# Patient Record
Sex: MALE | Race: WHITE | NOT HISPANIC OR LATINO | Employment: OTHER | RURAL
[De-identification: names, ages, dates, MRNs, and addresses within clinical notes are randomized per-mention and may not be internally consistent; named-entity substitution may affect disease eponyms.]

---

## 2020-02-25 ENCOUNTER — HISTORICAL (OUTPATIENT)
Dept: ADMINISTRATIVE | Facility: HOSPITAL | Age: 70
End: 2020-02-25

## 2020-02-26 LAB
LAB AP CLINICAL INFORMATION: NORMAL
LAB AP DIAGNOSIS - HISTORICAL: NORMAL
LAB AP GROSS PATHOLOGY - HISTORICAL: NORMAL
LAB AP SPECIMEN SUBMITTED - HISTORICAL: NORMAL

## 2020-12-14 ENCOUNTER — HISTORICAL (OUTPATIENT)
Dept: ADMINISTRATIVE | Facility: HOSPITAL | Age: 70
End: 2020-12-14

## 2020-12-14 LAB
ALBUMIN SERPL BCP-MCNC: 3.9 G/DL (ref 3.5–5)
ALBUMIN/GLOB SERPL: 1.2 {RATIO}
ALP SERPL-CCNC: 79 U/L (ref 45–115)
ALT SERPL W P-5'-P-CCNC: 32 U/L (ref 16–61)
ANION GAP SERPL CALCULATED.3IONS-SCNC: 9 MMOL/L (ref 7–16)
AST SERPL W P-5'-P-CCNC: 20 U/L (ref 15–37)
BASOPHILS # BLD AUTO: 0.06 X10E3/UL (ref 0–0.2)
BASOPHILS NFR BLD AUTO: 1.4 % (ref 0–1)
BILIRUB SERPL-MCNC: 0.5 MG/DL (ref 0–1.2)
BUN SERPL-MCNC: 21 MG/DL (ref 7–18)
BUN/CREAT SERPL: 19
CALCIUM SERPL-MCNC: 9 MG/DL (ref 8.5–10.1)
CHLORIDE SERPL-SCNC: 108 MMOL/L (ref 98–107)
CO2 SERPL-SCNC: 29 MMOL/L (ref 21–32)
CREAT SERPL-MCNC: 1.1 MG/DL (ref 0.7–1.3)
EOSINOPHIL # BLD AUTO: 0.34 X10E3/UL (ref 0–0.5)
EOSINOPHIL NFR BLD AUTO: 7.9 % (ref 1–4)
ERYTHROCYTE [DISTWIDTH] IN BLOOD BY AUTOMATED COUNT: 13.4 % (ref 11.5–14.5)
EST. AVERAGE GLUCOSE BLD GHB EST-MCNC: 127 MG/DL
GLOBULIN SER-MCNC: 3.2 G/DL (ref 2–4)
GLUCOSE SERPL-MCNC: 148 MG/DL (ref 74–106)
HBA1C MFR BLD HPLC: 6.4 %
HCT VFR BLD AUTO: 42 % (ref 40–54)
HGB BLD-MCNC: 13.4 G/DL (ref 13.5–18)
IMM GRANULOCYTES # BLD AUTO: 0.02 X10E3/UL (ref 0–0.04)
IMM GRANULOCYTES NFR BLD: 0.5 % (ref 0–0.4)
LYMPHOCYTES # BLD AUTO: 0.6 X10E3/UL (ref 1–4.8)
LYMPHOCYTES NFR BLD AUTO: 14 % (ref 27–41)
MCH RBC QN AUTO: 31.2 PG (ref 27–31)
MCHC RBC AUTO-ENTMCNC: 31.9 G/DL (ref 32–36)
MCV RBC AUTO: 97.7 FL (ref 80–96)
MONOCYTES # BLD AUTO: 0.38 X10E3/UL (ref 0–0.8)
MONOCYTES NFR BLD AUTO: 8.9 % (ref 2–6)
MPC BLD CALC-MCNC: 11.1 FL (ref 9.4–12.4)
NEUTROPHILS # BLD AUTO: 2.89 X10E3/UL (ref 1.8–7.7)
NEUTROPHILS NFR BLD AUTO: 67.3 % (ref 53–65)
NRBC # BLD AUTO: 0 X10E3/UL (ref 0–0)
NRBC, AUTO (.00): 0 /100 (ref 0–0)
PLATELET # BLD AUTO: 196 X10E3/UL (ref 150–400)
POTASSIUM SERPL-SCNC: 4.5 MMOL/L (ref 3.5–5.1)
PROT SERPL-MCNC: 7.1 G/DL (ref 6.4–8.2)
PSA SERPL-MCNC: 0.07 NG/ML (ref 0–4.4)
RBC # BLD AUTO: 4.3 X10E6/UL (ref 4.6–6.2)
SODIUM SERPL-SCNC: 141 MMOL/L (ref 136–145)
TESTOST SERPL-MCNC: 12 NG/DL (ref 240–950)
WBC # BLD AUTO: 4.29 X10E3/UL (ref 4.5–11)

## 2021-01-11 ENCOUNTER — HISTORICAL (OUTPATIENT)
Dept: ADMINISTRATIVE | Facility: HOSPITAL | Age: 71
End: 2021-01-11

## 2021-01-11 LAB
ALBUMIN SERPL BCP-MCNC: 3.9 G/DL (ref 3.5–5)
ALBUMIN/GLOB SERPL: 1.3 {RATIO}
ALP SERPL-CCNC: 87 U/L (ref 45–115)
ALT SERPL W P-5'-P-CCNC: 26 U/L (ref 16–61)
ANION GAP SERPL CALCULATED.3IONS-SCNC: 9.1 MMOL/L (ref 7–16)
AST SERPL W P-5'-P-CCNC: 16 U/L (ref 15–37)
BASOPHILS # BLD AUTO: 0.06 X10E3/UL (ref 0–0.2)
BASOPHILS NFR BLD AUTO: 1.6 % (ref 0–1)
BILIRUB SERPL-MCNC: 0.4 MG/DL (ref 0–1.2)
BUN SERPL-MCNC: 19 MG/DL (ref 7–18)
BUN/CREAT SERPL: 19
CALCIUM SERPL-MCNC: 8.8 MG/DL (ref 8.5–10.1)
CHLORIDE SERPL-SCNC: 109 MMOL/L (ref 98–107)
CO2 SERPL-SCNC: 29 MMOL/L (ref 21–32)
CREAT SERPL-MCNC: 1.01 MG/DL (ref 0.7–1.3)
EOSINOPHIL # BLD AUTO: 0.37 X10E3/UL (ref 0–0.5)
EOSINOPHIL NFR BLD AUTO: 10.2 % (ref 1–4)
ERYTHROCYTE [DISTWIDTH] IN BLOOD BY AUTOMATED COUNT: 13.4 % (ref 11.5–14.5)
GLOBULIN SER-MCNC: 2.9 G/DL (ref 2–4)
GLUCOSE SERPL-MCNC: 157 MG/DL (ref 74–106)
HCT VFR BLD AUTO: 41.7 % (ref 40–54)
HGB BLD-MCNC: 13.4 G/DL (ref 13.5–18)
IMM GRANULOCYTES # BLD AUTO: 0 X10E3/UL (ref 0–0.04)
IMM GRANULOCYTES NFR BLD: 0 % (ref 0–0.4)
LYMPHOCYTES # BLD AUTO: 0.58 X10E3/UL (ref 1–4.8)
LYMPHOCYTES NFR BLD AUTO: 15.9 % (ref 27–41)
MCH RBC QN AUTO: 31 PG (ref 27–31)
MCHC RBC AUTO-ENTMCNC: 32.1 G/DL (ref 32–36)
MCV RBC AUTO: 96.5 FL (ref 80–96)
MONOCYTES # BLD AUTO: 0.39 X10E3/UL (ref 0–0.8)
MONOCYTES NFR BLD AUTO: 10.7 % (ref 2–6)
MPC BLD CALC-MCNC: 11.8 FL (ref 9.4–12.4)
NEUTROPHILS # BLD AUTO: 2.24 X10E3/UL (ref 1.8–7.7)
NEUTROPHILS NFR BLD AUTO: 61.6 % (ref 53–65)
NRBC # BLD AUTO: 0 X10E3/UL (ref 0–0)
NRBC, AUTO (.00): 0 /100 (ref 0–0)
PLATELET # BLD AUTO: 174 X10E3/UL (ref 150–400)
POTASSIUM SERPL-SCNC: 5.1 MMOL/L (ref 3.5–5.1)
PROT SERPL-MCNC: 6.8 G/DL (ref 6.4–8.2)
PSA SERPL-MCNC: 0.04 NG/ML (ref 0–4.4)
PTH-INTACT SERPL-MCNC: 22.4 PG/ML (ref 18.4–80.1)
RBC # BLD AUTO: 4.32 X10E6/UL (ref 4.6–6.2)
SODIUM SERPL-SCNC: 142 MMOL/L (ref 136–145)
TESTOST SERPL-MCNC: 12 NG/DL (ref 240–950)
WBC # BLD AUTO: 3.64 X10E3/UL (ref 4.5–11)

## 2021-01-13 ENCOUNTER — HISTORICAL (OUTPATIENT)
Dept: ADMINISTRATIVE | Facility: HOSPITAL | Age: 71
End: 2021-01-13

## 2021-01-13 LAB
POC IONIZED CALCIUM: 1.25 MMOL/L (ref 1.15–1.35)
REPORT: NO GROWTH
REPORT: NORMAL

## 2021-02-09 ENCOUNTER — HISTORICAL (OUTPATIENT)
Dept: ADMINISTRATIVE | Facility: HOSPITAL | Age: 71
End: 2021-02-09

## 2021-02-09 LAB
ALBUMIN SERPL BCP-MCNC: 3.9 G/DL (ref 3.5–5)
ALBUMIN/GLOB SERPL: 1.1 {RATIO}
ALP SERPL-CCNC: 75 U/L (ref 45–115)
ALT SERPL W P-5'-P-CCNC: 24 U/L (ref 16–61)
ANION GAP SERPL CALCULATED.3IONS-SCNC: 9.7 MMOL/L (ref 7–16)
AST SERPL W P-5'-P-CCNC: 19 U/L (ref 15–37)
BASOPHILS # BLD AUTO: 0.05 X10E3/UL (ref 0–0.2)
BASOPHILS NFR BLD AUTO: 1.5 % (ref 0–1)
BILIRUB SERPL-MCNC: 0.5 MG/DL (ref 0–1.2)
BUN SERPL-MCNC: 19 MG/DL (ref 7–18)
BUN/CREAT SERPL: 18
CALCIUM SERPL-MCNC: 8.5 MG/DL (ref 8.5–10.1)
CHLORIDE SERPL-SCNC: 109 MMOL/L (ref 98–107)
CO2 SERPL-SCNC: 28 MMOL/L (ref 21–32)
CREAT SERPL-MCNC: 1.03 MG/DL (ref 0.7–1.3)
EOSINOPHIL # BLD AUTO: 0.38 X10E3/UL (ref 0–0.5)
EOSINOPHIL NFR BLD AUTO: 11.7 % (ref 1–4)
ERYTHROCYTE [DISTWIDTH] IN BLOOD BY AUTOMATED COUNT: 14.2 % (ref 11.5–14.5)
EST. AVERAGE GLUCOSE BLD GHB EST-MCNC: 134 MG/DL
GLOBULIN SER-MCNC: 3.4 G/DL (ref 2–4)
GLUCOSE SERPL-MCNC: 175 MG/DL (ref 74–106)
HBA1C MFR BLD HPLC: 6.6 %
HCT VFR BLD AUTO: 41.5 % (ref 40–54)
HGB BLD-MCNC: 13.3 G/DL (ref 13.5–18)
IMM GRANULOCYTES # BLD AUTO: 0.01 X10E3/UL (ref 0–0.04)
IMM GRANULOCYTES NFR BLD: 0.3 % (ref 0–0.4)
LYMPHOCYTES # BLD AUTO: 0.5 X10E3/UL (ref 1–4.8)
LYMPHOCYTES NFR BLD AUTO: 15.4 % (ref 27–41)
MCH RBC QN AUTO: 31 PG (ref 27–31)
MCHC RBC AUTO-ENTMCNC: 32 G/DL (ref 32–36)
MCV RBC AUTO: 96.7 FL (ref 80–96)
MONOCYTES # BLD AUTO: 0.48 X10E3/UL (ref 0–0.8)
MONOCYTES NFR BLD AUTO: 14.8 % (ref 2–6)
MPC BLD CALC-MCNC: 11.4 FL (ref 9.4–12.4)
NEUTROPHILS # BLD AUTO: 1.83 X10E3/UL (ref 1.8–7.7)
NEUTROPHILS NFR BLD AUTO: 56.3 % (ref 53–65)
NRBC # BLD AUTO: 0 X10E3/UL (ref 0–0)
NRBC, AUTO (.00): 0 /100 (ref 0–0)
PLATELET # BLD AUTO: 177 X10E3/UL (ref 150–400)
POTASSIUM SERPL-SCNC: 4.7 MMOL/L (ref 3.5–5.1)
PROT SERPL-MCNC: 7.3 G/DL (ref 6.4–8.2)
PSA SERPL-MCNC: 0.02 NG/ML (ref 0–4.4)
RBC # BLD AUTO: 4.29 X10E6/UL (ref 4.6–6.2)
SODIUM SERPL-SCNC: 142 MMOL/L (ref 136–145)
TSH SERPL DL<=0.005 MIU/L-ACNC: 2.52 UIU/ML (ref 0.36–3.74)
WBC # BLD AUTO: 3.25 X10E3/UL (ref 4.5–11)

## 2021-02-10 LAB — TESTOST SERPL-MCNC: <7 NG/DL (ref 240–950)

## 2021-09-15 ENCOUNTER — LAB VISIT (OUTPATIENT)
Dept: LAB | Facility: HOSPITAL | Age: 71
End: 2021-09-15
Attending: FAMILY MEDICINE
Payer: COMMERCIAL

## 2021-09-15 DIAGNOSIS — E87.5 SERUM POTASSIUM ELEVATED: Primary | ICD-10-CM

## 2021-09-15 LAB
ANION GAP SERPL CALCULATED.3IONS-SCNC: 11 MMOL/L (ref 7–16)
BUN SERPL-MCNC: 15 MG/DL (ref 7–18)
BUN/CREAT SERPL: 15 (ref 6–20)
CALCIUM SERPL-MCNC: 8.9 MG/DL (ref 8.5–10.1)
CHLORIDE SERPL-SCNC: 104 MMOL/L (ref 98–107)
CO2 SERPL-SCNC: 29 MMOL/L (ref 21–32)
CREAT SERPL-MCNC: 0.98 MG/DL (ref 0.7–1.3)
GLUCOSE SERPL-MCNC: 192 MG/DL (ref 74–106)
POTASSIUM SERPL-SCNC: 5.3 MMOL/L (ref 3.5–5.1)
SODIUM SERPL-SCNC: 139 MMOL/L (ref 136–145)
TESTOST SERPL-MCNC: 7 NG/DL (ref 240–950)

## 2021-09-15 PROCEDURE — 84403 ASSAY OF TOTAL TESTOSTERONE: CPT

## 2021-09-15 PROCEDURE — 80048 BASIC METABOLIC PNL TOTAL CA: CPT

## 2021-09-15 PROCEDURE — 36415 COLL VENOUS BLD VENIPUNCTURE: CPT

## 2021-12-16 ENCOUNTER — HOSPITAL ENCOUNTER (OUTPATIENT)
Dept: RADIOLOGY | Facility: HOSPITAL | Age: 71
Discharge: HOME | End: 2021-12-16
Attending: FAMILY MEDICINE
Payer: COMMERCIAL

## 2021-12-16 DIAGNOSIS — R05.9 COUGH: ICD-10-CM

## 2021-12-16 PROCEDURE — 71046 X-RAY EXAM CHEST 2 VIEWS: CPT | Mod: TC

## 2023-12-18 ENCOUNTER — HOSPITAL ENCOUNTER (OUTPATIENT)
Dept: RADIOLOGY | Facility: HOSPITAL | Age: 73
Discharge: HOME | End: 2023-12-18
Attending: INTERNAL MEDICINE
Payer: MEDICARE

## 2023-12-18 DIAGNOSIS — C61 PROSTATE CA: ICD-10-CM

## 2023-12-18 DIAGNOSIS — C79.51 METASTASIS TO BONE: ICD-10-CM

## 2023-12-18 PROCEDURE — 77080 DXA BONE DENSITY AXIAL: CPT | Mod: 26,,, | Performed by: RADIOLOGY

## 2023-12-18 PROCEDURE — 77080 DXA BONE DENSITY AXIAL: CPT | Mod: TC

## 2023-12-18 PROCEDURE — 77080 DXA BONE DENSITY AXIAL SKELETON 1 OR MORE SITES: ICD-10-PCS | Mod: 26,,, | Performed by: RADIOLOGY

## 2024-11-11 DIAGNOSIS — Z86.0100 HX OF COLONIC POLYPS: Primary | ICD-10-CM

## 2024-12-10 ENCOUNTER — LAB VISIT (OUTPATIENT)
Dept: LAB | Facility: HOSPITAL | Age: 74
End: 2024-12-10
Payer: MEDICARE

## 2024-12-10 DIAGNOSIS — R19.7 DIARRHEA OF PRESUMED INFECTIOUS ORIGIN: ICD-10-CM

## 2024-12-10 DIAGNOSIS — N18.6 TYPE 2 DIABETES MELLITUS WITH END-STAGE RENAL DISEASE: Primary | ICD-10-CM

## 2024-12-10 DIAGNOSIS — E11.22 TYPE 2 DIABETES MELLITUS WITH END-STAGE RENAL DISEASE: Primary | ICD-10-CM

## 2024-12-10 DIAGNOSIS — R53.83 FATIGUE, UNSPECIFIED TYPE: ICD-10-CM

## 2024-12-10 LAB
ALBUMIN SERPL BCP-MCNC: 4 G/DL (ref 3.4–4.8)
ALBUMIN/GLOB SERPL: 1.1 {RATIO}
ALP SERPL-CCNC: 92 U/L (ref 40–150)
ALT SERPL W P-5'-P-CCNC: 19 U/L
ANION GAP SERPL CALCULATED.3IONS-SCNC: 15 MMOL/L (ref 7–16)
AST SERPL W P-5'-P-CCNC: 21 U/L (ref 5–34)
BASOPHILS # BLD AUTO: 0.07 K/UL (ref 0–0.2)
BASOPHILS NFR BLD AUTO: 1 % (ref 0–1)
BILIRUB SERPL-MCNC: 0.4 MG/DL
BUN SERPL-MCNC: 16 MG/DL (ref 8–26)
BUN/CREAT SERPL: 12 (ref 6–20)
CALCIUM SERPL-MCNC: 9.9 MG/DL (ref 8.8–10)
CHLORIDE SERPL-SCNC: 107 MMOL/L (ref 98–107)
CO2 SERPL-SCNC: 21 MMOL/L (ref 23–31)
CREAT SERPL-MCNC: 1.31 MG/DL (ref 0.72–1.25)
DIFFERENTIAL METHOD BLD: ABNORMAL
EGFR (NO RACE VARIABLE) (RUSH/TITUS): 57 ML/MIN/1.73M2
EOSINOPHIL # BLD AUTO: 0.2 K/UL (ref 0–0.5)
EOSINOPHIL NFR BLD AUTO: 3 % (ref 1–4)
ERYTHROCYTE [DISTWIDTH] IN BLOOD BY AUTOMATED COUNT: 13.2 % (ref 11.5–14.5)
GLOBULIN SER-MCNC: 3.6 G/DL (ref 2–4)
GLUCOSE SERPL-MCNC: 177 MG/DL (ref 82–115)
HCT VFR BLD AUTO: 43.1 % (ref 40–54)
HGB BLD-MCNC: 14.4 G/DL (ref 13.5–18)
IMM GRANULOCYTES # BLD AUTO: 0.02 K/UL (ref 0–0.04)
IMM GRANULOCYTES NFR BLD: 0.3 % (ref 0–0.4)
LYMPHOCYTES # BLD AUTO: 1.07 K/UL (ref 1–4.8)
LYMPHOCYTES NFR BLD AUTO: 15.9 % (ref 27–41)
MCH RBC QN AUTO: 30.6 PG (ref 27–31)
MCHC RBC AUTO-ENTMCNC: 33.4 G/DL (ref 32–36)
MCV RBC AUTO: 91.7 FL (ref 80–96)
MONOCYTES # BLD AUTO: 0.48 K/UL (ref 0–0.8)
MONOCYTES NFR BLD AUTO: 7.1 % (ref 2–6)
MPC BLD CALC-MCNC: 10.4 FL (ref 9.4–12.4)
NEUTROPHILS # BLD AUTO: 4.9 K/UL (ref 1.8–7.7)
NEUTROPHILS NFR BLD AUTO: 72.7 % (ref 53–65)
NRBC # BLD AUTO: 0 X10E3/UL
NRBC, AUTO (.00): 0 %
PLATELET # BLD AUTO: 250 K/UL (ref 150–400)
POTASSIUM SERPL-SCNC: 4.9 MMOL/L (ref 3.5–5.1)
PROT SERPL-MCNC: 7.6 G/DL (ref 5.8–7.6)
RBC # BLD AUTO: 4.7 M/UL (ref 4.6–6.2)
SODIUM SERPL-SCNC: 138 MMOL/L (ref 136–145)
TSH SERPL DL<=0.005 MIU/L-ACNC: 2.31 UIU/ML (ref 0.35–4.94)
WBC # BLD AUTO: 6.74 K/UL (ref 4.5–11)

## 2024-12-10 PROCEDURE — 36415 COLL VENOUS BLD VENIPUNCTURE: CPT

## 2024-12-10 PROCEDURE — 80053 COMPREHEN METABOLIC PANEL: CPT

## 2024-12-10 PROCEDURE — 85025 COMPLETE CBC W/AUTO DIFF WBC: CPT

## 2024-12-10 PROCEDURE — 84443 ASSAY THYROID STIM HORMONE: CPT

## 2025-02-28 ENCOUNTER — HOSPITAL ENCOUNTER (OUTPATIENT)
Dept: GASTROENTEROLOGY | Facility: HOSPITAL | Age: 75
Discharge: HOME | End: 2025-02-28
Attending: INTERNAL MEDICINE | Admitting: INTERNAL MEDICINE
Payer: MEDICARE

## 2025-02-28 ENCOUNTER — ANESTHESIA (OUTPATIENT)
Dept: GASTROENTEROLOGY | Facility: HOSPITAL | Age: 75
End: 2025-02-28
Payer: MEDICARE

## 2025-02-28 ENCOUNTER — ANESTHESIA EVENT (OUTPATIENT)
Dept: GASTROENTEROLOGY | Facility: HOSPITAL | Age: 75
End: 2025-02-28
Payer: MEDICARE

## 2025-02-28 VITALS
RESPIRATION RATE: 13 BRPM | WEIGHT: 220 LBS | DIASTOLIC BLOOD PRESSURE: 70 MMHG | HEART RATE: 62 BPM | BODY MASS INDEX: 29.16 KG/M2 | HEIGHT: 73 IN | SYSTOLIC BLOOD PRESSURE: 121 MMHG | OXYGEN SATURATION: 99 % | TEMPERATURE: 98 F

## 2025-02-28 DIAGNOSIS — K63.5 POLYP OF DESCENDING COLON, UNSPECIFIED TYPE: ICD-10-CM

## 2025-02-28 DIAGNOSIS — K64.8 INTERNAL HEMORRHOID: ICD-10-CM

## 2025-02-28 DIAGNOSIS — K57.30 DIVERTICULOSIS OF COLON: ICD-10-CM

## 2025-02-28 DIAGNOSIS — Z12.11 SCREENING FOR MALIGNANT NEOPLASM OF COLON: Primary | ICD-10-CM

## 2025-02-28 DIAGNOSIS — Z86.0100 HX OF COLONIC POLYPS: ICD-10-CM

## 2025-02-28 PROCEDURE — 63600175 PHARM REV CODE 636 W HCPCS: Performed by: NURSE ANESTHETIST, CERTIFIED REGISTERED

## 2025-02-28 PROCEDURE — 88305 TISSUE EXAM BY PATHOLOGIST: CPT | Mod: 26,,, | Performed by: PATHOLOGY

## 2025-02-28 PROCEDURE — 27201423 OPTIME MED/SURG SUP & DEVICES STERILE SUPPLY

## 2025-02-28 PROCEDURE — 27000716 HC OXISENSOR PROBE, ANY SIZE: Performed by: NURSE ANESTHETIST, CERTIFIED REGISTERED

## 2025-02-28 PROCEDURE — 45380 COLONOSCOPY AND BIOPSY: CPT | Mod: PT | Performed by: INTERNAL MEDICINE

## 2025-02-28 PROCEDURE — 88305 TISSUE EXAM BY PATHOLOGIST: CPT | Mod: TC,SUR | Performed by: INTERNAL MEDICINE

## 2025-02-28 PROCEDURE — 37000008 HC ANESTHESIA 1ST 15 MINUTES

## 2025-02-28 PROCEDURE — 45380 COLONOSCOPY AND BIOPSY: CPT | Mod: PT,,, | Performed by: INTERNAL MEDICINE

## 2025-02-28 PROCEDURE — 27000284 HC CANNULA NASAL: Performed by: NURSE ANESTHETIST, CERTIFIED REGISTERED

## 2025-02-28 PROCEDURE — 37000009 HC ANESTHESIA EA ADD 15 MINS

## 2025-02-28 RX ORDER — SODIUM CHLORIDE, SODIUM LACTATE, POTASSIUM CHLORIDE, CALCIUM CHLORIDE 600; 310; 30; 20 MG/100ML; MG/100ML; MG/100ML; MG/100ML
INJECTION, SOLUTION INTRAVENOUS CONTINUOUS
Status: DISCONTINUED | OUTPATIENT
Start: 2025-02-28 | End: 2025-03-01 | Stop reason: HOSPADM

## 2025-02-28 RX ORDER — ROSUVASTATIN CALCIUM 5 MG/1
5 TABLET, COATED ORAL NIGHTLY
COMMUNITY
Start: 2025-02-05

## 2025-02-28 RX ORDER — APALUTAMIDE 60 MG/1
60 TABLET, FILM COATED ORAL DAILY
COMMUNITY

## 2025-02-28 RX ORDER — PIOGLITAZONEHYDROCHLORIDE 30 MG/1
1 TABLET ORAL DAILY
COMMUNITY

## 2025-02-28 RX ORDER — GLIMEPIRIDE 2 MG/1
2 TABLET ORAL 2 TIMES DAILY
COMMUNITY

## 2025-02-28 RX ORDER — SODIUM CHLORIDE 0.9 % (FLUSH) 0.9 %
10 SYRINGE (ML) INJECTION EVERY 6 HOURS PRN
Status: DISCONTINUED | OUTPATIENT
Start: 2025-02-28 | End: 2025-03-01 | Stop reason: HOSPADM

## 2025-02-28 RX ORDER — LIDOCAINE HYDROCHLORIDE 20 MG/ML
INJECTION, SOLUTION EPIDURAL; INFILTRATION; INTRACAUDAL; PERINEURAL
Status: DISCONTINUED | OUTPATIENT
Start: 2025-02-28 | End: 2025-02-28

## 2025-02-28 RX ORDER — INSULIN GLARGINE 300 U/ML
300 INJECTION, SOLUTION SUBCUTANEOUS DAILY
COMMUNITY
Start: 2025-02-05

## 2025-02-28 RX ORDER — PROPOFOL 10 MG/ML
VIAL (ML) INTRAVENOUS
Status: DISCONTINUED | OUTPATIENT
Start: 2025-02-28 | End: 2025-02-28

## 2025-02-28 RX ADMIN — LIDOCAINE HYDROCHLORIDE 100 MG: 20 INJECTION, SOLUTION EPIDURAL; INFILTRATION; INTRACAUDAL; PERINEURAL at 11:02

## 2025-02-28 RX ADMIN — PROPOFOL 100 MG: 10 INJECTION, EMULSION INTRAVENOUS at 11:02

## 2025-02-28 NOTE — H&P
[facility]  Gastroenterology  H&P    Patient Name: Reese Blackwell  MRN: [id number]  Admission Date: 2/28/2025  Code Status: No Order    Attending Provider: Dakotah Bush MD   Primary Care Physician: Jamin Delacruz MD  Principal Problem:<principal problem not specified>    Subjective:     History of Present Illness:  This patient is a 74-year-old man with history of colon polyps.  His last colonoscopy was in 2020.    Past Medical History:   Diagnosis Date    Cancer     Diabetes mellitus        Past Surgical History:   Procedure Laterality Date    PROSTATECTOMY      SHOULDER SURGERY         Review of patient's allergies indicates:  No Known Allergies  Family History    None       Tobacco Use    Smoking status: Never    Smokeless tobacco: Current   Substance and Sexual Activity    Alcohol use: Not Currently    Drug use: Never    Sexual activity: Not on file     Review of Systems   Constitutional: Negative.    HENT: Negative.     Respiratory: Negative.     Cardiovascular: Negative.    Gastrointestinal: Negative.      Objective:     Vital Signs (Most Recent):  Temp: 98 °F (36.7 °C) (02/28/25 1001)  Pulse: 67 (02/28/25 1001)  Resp: 17 (02/28/25 1001)  BP: 123/64 (02/28/25 1001)  SpO2: 96 % (02/28/25 1001) Vital Signs (24h Range):  Temp:  [98 °F (36.7 °C)] 98 °F (36.7 °C)  Pulse:  [67] 67  Resp:  [17] 17  SpO2:  [96 %] 96 %  BP: (123)/(64) 123/64     Weight: 99.8 kg (220 lb) (02/28/25 1001)  Body mass index is 29.03 kg/m².    No intake or output data in the 24 hours ending 02/28/25 1049    Lines/Drains/Airways       Peripheral Intravenous Line  Duration                  Peripheral IV - Single Lumen 02/28/25 1001 22 G Left Antecubital <1 day                    Physical Exam  Vitals reviewed.   Constitutional:       General: He is not in acute distress.     Appearance: Normal appearance. He is well-developed. He is not ill-appearing.   HENT:      Head: Normocephalic and atraumatic.      Nose: Nose normal.  "  Eyes:      Pupils: Pupils are equal, round, and reactive to light.   Cardiovascular:      Rate and Rhythm: Normal rate and regular rhythm.   Pulmonary:      Effort: Pulmonary effort is normal.      Breath sounds: Normal breath sounds. No wheezing.   Abdominal:      General: Abdomen is flat. Bowel sounds are normal. There is no distension.      Palpations: Abdomen is soft.      Tenderness: There is no abdominal tenderness. There is no guarding.   Skin:     General: Skin is warm and dry.      Coloration: Skin is not jaundiced.   Neurological:      Mental Status: He is alert.   Psychiatric:         Attention and Perception: Attention normal.         Mood and Affect: Affect normal.         Speech: Speech normal.         Behavior: Behavior is cooperative.      Comments: Pt was calm while speaking.         Significant Labs:  CBC: No results for input(s): "WBC", "HGB", "HCT", "PLT" in the last 48 hours.  CMP: No results for input(s): "GLU", "CALCIUM", "ALBUMIN", "PROT", "NA", "K", "CO2", "CL", "BUN", "CREATININE", "ALKPHOS", "ALT", "AST", "BILITOT" in the last 48 hours.    Significant Imaging:  Imaging results within the past 24 hours have been reviewed.    Assessment/Plan:     There are no hospital problems to display for this patient.        Impression: History of colon polyp  Plan: Colonoscopy today    Dakotah Bush MD  Gastroenterology  [facility]  "

## 2025-02-28 NOTE — ANESTHESIA POSTPROCEDURE EVALUATION
Anesthesia Post Evaluation    Patient: Reese Blackwell    Procedure(s) Performed: Colonoscopy    Final Anesthesia Type: MAC      Patient location during evaluation: GI PACU  Patient participation: Yes- Able to Participate  Level of consciousness: awake and alert  Post-procedure vital signs: reviewed and stable  Pain management: adequate  Airway patency: patent    PONV status at discharge: No PONV  Anesthetic complications: no      Cardiovascular status: blood pressure returned to baseline and hemodynamically stable  Respiratory status: spontaneous ventilation  Hydration status: euvolemic  Follow-up not needed.  Comments: Refer to nursing notes for pain/whit score upon discharge from recovery.              Vitals Value Taken Time   /76 02/28/25 11:38   Temp 97.8 02/28/25 13:55   Pulse 59 02/28/25 11:40   Resp 15 02/28/25 11:40   SpO2 98 % 02/28/25 11:40   Vitals shown include unfiled device data.      Event Time   Out of Recovery 11:44:30         Pain/Whit Score: Whit Score: 10 (2/28/2025 11:24 AM)

## 2025-02-28 NOTE — DISCHARGE INSTRUCTIONS
Procedure Date  2/28/25     Impression  Overall Impression:   Polyp in the descending colon; performed cold forceps biopsy  Diverticulosis in the descending colon and sigmoid colon  (grade 1) hemorrhoids  Digital rectal exam revealed no mass.  The colon was examined from the rectum to the cecum and diverticula were present in the descending and sigmoid colon.  One diminutive polyp was removed from the descending colon with biopsy.  Grade 1 internal hemorrhoids were present.     Recommendation  Await pathology results, due: 3/4/2025   Repeat colonoscopy in 5 years      Disposition: Discharge patient to home in stable condition.  High-fiber diet.  No driving until tomorrow.  Follow up pathology results next week.  Follow up with PCP as scheduled, return to GI clinic p.r.n..  Consider repeat colonoscopy in 5 years if patient is in good health at that time.   NO DRIVING, OPERATING EQUIPMENT, OR SIGNING LEGAL DOCUMENTS FOR 24 HOURS.

## 2025-02-28 NOTE — TRANSFER OF CARE
"Anesthesia Transfer of Care Note    Patient: Reese Blackwell    Procedure(s) Performed: * No procedures listed *    Patient location: GI    Anesthesia Type: MAC    Transport from OR: Transported from OR on room air with adequate spontaneous ventilation. Continuous ECG monitoring in transport. Continuous SpO2 monitoring in transport    Post pain: adequate analgesia    Post assessment: no apparent anesthetic complications    Post vital signs: stable    Level of consciousness: sedated and responds to stimulation    Nausea/Vomiting: no nausea/vomiting    Complications: none    Transfer of care protocol was followedComments: Good SV continue, NAD, VSS, RTRN      Last vitals: Visit Vitals  BP (!) 106/47 (BP Location: Right arm, Patient Position: Lying)   Pulse 69   Temp 36.4 °C (97.6 °F) (Oral)   Resp 20   Ht 6' 1" (1.854 m)   Wt 99.8 kg (220 lb)   SpO2 98%   BMI 29.03 kg/m²     "

## 2025-02-28 NOTE — ANESTHESIA PREPROCEDURE EVALUATION
02/28/2025  Reese Blackwell is a 74 y.o., male.      Pre-op Assessment    I have reviewed the Patient Summary Reports.     I have reviewed the Nursing Notes. I have reviewed the NPO Status.   I have reviewed the Medications.     Review of Systems  Anesthesia Hx:  No problems with previous Anesthesia                Social:  Non-Smoker, No Alcohol Use       Hematology/Oncology:                        --  Cancer in past history:              surgery   Oncology Comments: Prostate     Cardiovascular:     Hypertension           hyperlipidemia                               Endocrine:  Diabetes, well controlled, type 2         Obesity / BMI > 30      Physical Exam  General: Cooperative, Alert, Well nourished and Oriented    Airway:  Mallampati: III   Mouth Opening: Normal  TM Distance: 4 - 6 cm  Tongue: Normal  Neck ROM: Normal ROM    Dental:  Intact        Anesthesia Plan  Type of Anesthesia, risks & benefits discussed:    Anesthesia Type: MAC  Intra-op Monitoring Plan: Standard ASA Monitors  Post Op Pain Control Plan: multimodal analgesia and IV/PO Opioids PRN  Induction:  IV  Informed Consent: Informed consent signed with the Patient and all parties understand the risks and agree with anesthesia plan.  All questions answered. Patient consented to blood products? Yes  ASA Score: 3  Day of Surgery Review of History & Physical: I have interviewed and examined the patient. I have reviewed the patient's H&P dated: There are no significant changes.     Ready For Surgery From Anesthesia Perspective.     .  Past Medical History:   Diagnosis Date    Cancer     Diabetes mellitus        Past Surgical History:   Procedure Laterality Date    PROSTATECTOMY      SHOULDER SURGERY         No family history on file.    Social History     Socioeconomic History    Marital status: Unknown   Tobacco Use    Smoking status: Never     Smokeless tobacco: Current   Substance and Sexual Activity    Alcohol use: Not Currently    Drug use: Never       Current Medications[1]    Review of patient's allergies indicates:  No Known Allergies            [1]   Current Outpatient Medications   Medication Sig Dispense Refill    rosuvastatin (CRESTOR) 5 MG tablet Take 5 mg by mouth every evening.      TOUJEO SOLOSTAR U-300 INSULIN 300 unit/mL (1.5 mL) InPn pen Inject 300 Units into the skin once daily.      ERLEADA 60 mg Tab Take 60 mg by mouth once daily.      glimepiride (AMARYL) 2 MG tablet Take 2 mg by mouth 2 (two) times a day.      pioglitazone (ACTOS) 30 MG tablet Take 1 tablet by mouth once daily.       No current facility-administered medications for this encounter.

## 2025-03-03 ENCOUNTER — RESULTS FOLLOW-UP (OUTPATIENT)
Dept: GASTROENTEROLOGY | Facility: CLINIC | Age: 75
End: 2025-03-03
Payer: MEDICARE

## 2025-03-03 LAB
ESTROGEN SERPL-MCNC: NORMAL PG/ML
INSULIN SERPL-ACNC: NORMAL U[IU]/ML
LAB AP GROSS DESCRIPTION: NORMAL
LAB AP LABORATORY NOTES: NORMAL
T3RU NFR SERPL: NORMAL %

## 2025-03-03 NOTE — PROGRESS NOTES
These colon polyps were hyperplastic.  Recommendation:  Consider repeat colonoscopy in 5 years if patient is in good health at that time.

## 2025-03-04 NOTE — TELEPHONE ENCOUNTER
Results and recommendations called to patient. Verbalized understanding.          ----- Message from Dakotah Bush MD sent at 3/3/2025  2:51 PM CST -----  These colon polyps were hyperplastic.  Recommendation:  Consider repeat colonoscopy in 5 years if patient is in good health at that time.  ----- Message -----  From: Lab, Background User  Sent: 3/3/2025   8:13 AM CST  To: Dakotah Bush MD